# Patient Record
(demographics unavailable — no encounter records)

---

## 2025-01-22 NOTE — DISCUSSION/SUMMARY
[FreeTextEntry1] : Stable cardiac status. Patient is at low estimated cardiac risk without cardiac contraindications to orthopedic surgery as planned. Contin diet and exercise. Contin antihyperlipidemic regimen. Follow up 1 year.  [EKG obtained to assist in diagnosis and management of assessed problem(s)] : EKG obtained to assist in diagnosis and management of assessed problem(s)

## 2025-01-22 NOTE — HISTORY OF PRESENT ILLNESS
[FreeTextEntry1] : Here for routine follow up. He has left shoulder dislocation and is pre-op surgical repair. He has chronic GERD symptoms when not taking his PPI. No exertional symptoms - no CP or SOB with walking or swimming. Taking statin + zetia regularly. Reports muscle pains resolved with taking CoQ10. Labs in December provided for review in electronic form, notable for TC in 160s, LDL 89.